# Patient Record
Sex: FEMALE
[De-identification: names, ages, dates, MRNs, and addresses within clinical notes are randomized per-mention and may not be internally consistent; named-entity substitution may affect disease eponyms.]

---

## 2022-04-22 PROBLEM — Z00.00 ENCOUNTER FOR PREVENTIVE HEALTH EXAMINATION: Status: ACTIVE | Noted: 2022-04-22

## 2022-04-26 ENCOUNTER — APPOINTMENT (OUTPATIENT)
Dept: PAIN MANAGEMENT | Facility: CLINIC | Age: 87
End: 2022-04-26
Payer: MEDICARE

## 2022-04-26 VITALS — WEIGHT: 90 LBS | BODY MASS INDEX: 18.14 KG/M2 | HEIGHT: 59 IN

## 2022-04-26 PROCEDURE — 99214 OFFICE O/P EST MOD 30 MIN: CPT | Mod: 25

## 2022-04-26 PROCEDURE — 62323 NJX INTERLAMINAR LMBR/SAC: CPT

## 2022-04-26 NOTE — PROCEDURE
[FreeTextEntry3] : Date of Service: 04/26/2022 \par \par Account: [id number]\par \par Patient: Jenni Shirley \par \par YOB: 1930\par \par Age: 89 year\par \par Surgeon: Chey Martin M.D.\par \par Pre-Operative Diagnosis:  Lumbosacral Radiculitis (M54.17)\par \par Post Operative Diagnosis: Lumbosacral Radiculitis (M54.17)\par \par Procedure: Interlaminar lumbar epidural steroid injection (L4-5) under fluoroscopic guidance\par \par Anesthesia:           MAC\par \par \par This procedure was carried out using fluoroscopic guidance.  The risks and benefits of the procedure were discussed extensively with the patient.  The consent of the patient was obtained and the following procedure was performed.\par \par The patient was placed in the prone position.  The lumbar area was prepped and draped in a sterile fashion.  Under AP view with slight cephalad-caudad angulation, the L4-5 interspace was identified and marked.  Using sterile technique the superficial skin was anesthetized with 1% Lidocaine without epinephrine.  A 20 gauge Tuohoy needle was advanced into the epidural space under fluoroscopy using ncxzh-rpjglkxuh-sjubh technique and using loss of resistance at the L4-5 level.  After negative aspiration for heme or CSF, an epidurogram was obtained using 3 cc Omnipaque contrast confirming epidural placement of the needle. \par \par Lumbar epidurogram showed no intrathecal or intravascular spread and showed adequate bilateral epidural spread from L1 to S1 levels.\par \par After this, 5 cc of preservative free normal saline and 80 mg of Kenalog were injected into the epidural space.\par \par The needle was subsequently removed.  Anesthesia personnel were present throughout the procedure.\par \par The patient tolerated the procedure well and was instructed to contact me immediately if there were any problems.\par \par Chey Martin M.D.\par \par

## 2022-04-26 NOTE — HISTORY OF PRESENT ILLNESS
[Lower back] : lower back [6] : 6 [2] : 2 [Burning] : burning [Intermittent] : intermittent [Household chores] : household chores [Leisure] : leisure [Injection therapy] : injection therapy [Walking] : walking [de-identified] : 04/26/2022: follow up today.  She thought she was getting an injection today.  Will schedule LESI L4-L5.\par \par 8/10/21- Patient had 70% relief from LESI. Complains of muscle spasms at night. Tizanidine renewed and told to drink water.\par \par 7/13/21: follow up today after bilateral L4/5 TFESI on 7/1/21. having issues with Colitis and swallowing and having endoscopy in 2 weeks. Pain when she walks. \par \par 6/25/21- Patient states 6 weeks ago she started saying pain was returning. Would like to schedule injection.\par \par 8/7/20: follow up today after b/l TFESI L4/5. Pt with relief of her back pain however still with burning in the bilateral legs. [] : no [FreeTextEntry7] : b/l legs

## 2022-04-26 NOTE — ASSESSMENT
[FreeTextEntry1] : Patient is presenting with acute/sub-acute radicular pain with impairment in ADLs and functionality.  The pain has not responded to  conservative care including nsaid therapy and/or physical therapy.  There is no bleeding tendency, unstable medical condition, or systemic infection.\par After discussing various treatment options with the patient including but not limited to oral medications, physical therapy, exercise, modalities as well as interventional spinal injections, we have decided with the following plan:\par I personally reviewed the MRI/CT scan images and agree with the radiologist's report. The radiological findings were discussed with the patient.\par The risks, benefits, contents and alternatives to injection were explained in full to the patient. Risks outlined include but are not limited to infection,sepsis, bleeding, post-dural puncture headache, nerve damage, temporary increase in pain, syncopal episode, failure to resolve symptoms, allergic reaction, symptom recurrence, and elevation of blood sugar in diabetics. Cortisone may cause immunosuppression. Patient understands the risks. All questions were answered. After discussion of options, patient requested an injection. Information regarding the injection was given to the patient. Which medications to stop prior to the injection was explained to the patient as well.\par Follow up in 1-2 weeks post injection for re-evaluation. \par Continue Home exercises, stretching, activity modification, physical therapy, and conservative care.\par Patient is presenting with acute/sub-acute radicular pain with impairment in ADLs and functionality. The pain has not responded to conservative care including nsaid therapy and/or physical therapy. There is no bleeding tendency, unstable medical condition, or systemic infection.\par \par

## 2022-05-24 ENCOUNTER — APPOINTMENT (OUTPATIENT)
Dept: PAIN MANAGEMENT | Facility: CLINIC | Age: 87
End: 2022-05-24
Payer: MEDICARE

## 2022-05-24 VITALS — WEIGHT: 90 LBS | HEIGHT: 59 IN | BODY MASS INDEX: 18.14 KG/M2

## 2022-05-24 PROCEDURE — 99214 OFFICE O/P EST MOD 30 MIN: CPT

## 2022-05-24 NOTE — HISTORY OF PRESENT ILLNESS
[Lower back] : lower back [6] : 6 [2] : 2 [Burning] : burning [Household chores] : household chores [Leisure] : leisure [Injection therapy] : injection therapy [Walking] : walking [Constant] : constant [Sitting] : sitting [Standing] : standing [Bending forward] : bending forward [Extending back] : extending back [Lying in bed] : lying in bed [Retired] : Work status: retired [] : no [FreeTextEntry7] : b/l legs [de-identified] : 05/24/2022: follow up today after LESI L4/5 on 4/26/22.  limited relief following. Pain in the lower back with radiation to the hips. worse with walking.  her last injection was a tfesi that gave her the most relief. \par \par 04/26/2022: follow up today.  She thought she was getting an injection today.  Will schedule LESI L4-L5.\par \par 8/10/21- Patient had 70% relief from LESI. Complains of muscle spasms at night. Tizanidine renewed and told to drink water.\par \par 7/13/21: follow up today after bilateral L4/5 TFESI on 7/1/21. having issues with Colitis and swallowing and having endoscopy in 2 weeks. Pain when she walks. \par \par 6/25/21- Patient states 6 weeks ago she started saying pain was returning. Would like to schedule injection.\par \par 8/7/20: follow up today after b/l TFESI L4/5. Pt with relief of her back pain however still with burning in the bilateral legs.\par \par \par Injections:\par LESI L4/5 (4/26/22)\par b/l TFESI L4/5 (7/1/22)\par \par MRI 2/14/19: L5-S1 there is a stable diffuse degenerative bulging disc with mild central and bilateral\par foraminal stenosis.\par At L4-5 there is decrease in the previously noted central disc herniation. However\par progressive degenerative productive posterior change left greater than right is noted and\par this does result in progressive moderate to severe central stenosis.\par At L3-4 there is progression of the proximal right foraminal disc herniation with\par progressive central stenosis and progressive right foraminal stenosis.\par At L2-3 there is a progressive diffuse degenerative bulging disc with degenerative\par posterior change resulting in progressive moderate to severe central stenosis.

## 2022-05-24 NOTE — ASSESSMENT
[FreeTextEntry1] : Patient is presenting with acute/sub-acute radicular pain with impairment in ADLs and functionality.  The pain has not responded to  conservative care including nsaid therapy and/or physical therapy.  There is no bleeding tendency, unstable medical condition, or systemic infection.\par After discussing various treatment options with the patient including but not limited to oral medications, physical therapy, exercise, modalities as well as interventional spinal injections, we have decided with the following plan:\par I personally reviewed the MRI/CT scan images and agree with the radiologist's report. The radiological findings were discussed with the patient.\par The risks, benefits, contents and alternatives to injection were explained in full to the patient. Risks outlined include but are not limited to infection,sepsis, bleeding, post-dural puncture headache, nerve damage, temporary increase in pain, syncopal episode, failure to resolve symptoms, allergic reaction, symptom recurrence, and elevation of blood sugar in diabetics. Cortisone may cause immunosuppression. Patient understands the risks. All questions were answered. After discussion of options, patient requested an injection. Information regarding the injection was given to the patient. Which medications to stop prior to the injection was explained to the patient as well.\par Follow up in 1-2 weeks post injection for re-evaluation. \par Continue Home exercises, stretching, activity modification, physical therapy, and conservative care.\par Patient is presenting with acute/sub-acute radicular pain with impairment in ADLs and functionality. The pain has not responded to conservative care including nsaid therapy and/or physical therapy. There is no bleeding tendency, unstable medical condition, or systemic infection.\par \par \par b/l TFESI L4/5\par

## 2022-06-07 ENCOUNTER — APPOINTMENT (OUTPATIENT)
Dept: PAIN MANAGEMENT | Facility: CLINIC | Age: 87
End: 2022-06-07

## 2022-06-07 ENCOUNTER — APPOINTMENT (OUTPATIENT)
Dept: PAIN MANAGEMENT | Facility: CLINIC | Age: 87
End: 2022-06-07
Payer: MEDICARE

## 2022-06-07 PROCEDURE — 64483 NJX AA&/STRD TFRM EPI L/S 1: CPT | Mod: 50

## 2022-06-07 NOTE — PROCEDURE
[FreeTextEntry3] : Date of Service: 06/07/2022 \par \par Account: [id number]\par \par Patient: Jenni Shirley \par \par YOB: 1930\par \par Age: 89 year\par   \par \par Surgeon:                               Chey Martin M.D.\par \par Assistant:                                 None.\par  \par Pre-Operative Diagnosis:     Lumbosacral Radiculitis (M54.17)                \par  \par Post Operative Diagnosis:    Lumbosacral Radiculitis (M54.17)                \par  \par Procedure:                              Right L4-5 transforaminal epidural steroid injection\par \par                                                   Left L4-5 transforaminal epidural steroid injection under fluoroscopic guidance.\par \par Anesthesia:                             MAC\par \par \par This procedure was carried out using fluoroscopic guidance.  The risks and benefits of the procedure were discussed extensively with the patient.  The consent of the patient was obtained and the following procedure was performed. The patient was placed in the prone position on the fluoroscopic table and the lumbar area was prepped and draped in a sterile fashion.\par \par The left L4-5 neural foramen was then identified on left oblique  "danica dog" anatomical view at the 6 o' clock position using fluoroscopic guidance, and the area was marked. The overlying skin and subcutaneous structures were anesthetized using sterile technique with 1% Lidocaine.  A 22 gauge spinal needle was directed toward the inferior (6 o'clock) position of the pedicle, which formed the roof of the identified foramen.  Once in the epidural space, after negative aspiration for heme and CSF, 1cc of Omnipaque contrast was injected to confirm epidural location and assess filling defects and rule out intravascular needle placement.\par \par The following contrast flow observed: no intravascular or intrathecal flow pattern was noted.  No blood or CSF was aspirated. Omnipaque spread medially in epidural space. \par \par After this, an injectate of 3 cc preservative free normal saline plus 40 mg of Kenalog was injected in the epidural space.\par \par The right L4-5 neural foramen was then identified on right oblique "danica dog" anatomical view at the 6 o' clock position using fluoroscopic guidance, and the area was marked. The overlying skin and subcutaneous structures were anesthetized using sterile technique with 1% Lidocaine.  A 22 gauge spinal needle was directed toward the inferior (6 o'clock) position of the pedicle, which formed the roof of the identified foramen.  Once in the epidural space, after negative aspiration for heme and CSF, 1cc of Omnipaque contrast was injected to confirm epidural location and assess filling defects and rule out intravascular needle placement.\par \par The following contrast flow observed: no intravascular or intrathecal flow pattern was noted.  No blood or CSF was aspirated. Omnipaque spread medially in epidural space. \par \par After this, an injectate of 3 cc preservative free normal saline plus 40 mg of Kenalog was injected in the epidural space.\par \par The needle was subsequently removed.  Vital signs remained normal.  Pulse oximeter was used throughout the procedure and the patient's pulse and oxygen saturation remained within normal limits.  The patient tolerated the procedure well.  There were no complications.  The patient was instructed to apply ice over the injection sites for twenty minutes every two hours for the next 24 to 48 hours.  The patient was also instructed to contact me immediately if there were any problems.\par \par Chey Martin M.D.\par

## 2022-06-28 ENCOUNTER — APPOINTMENT (OUTPATIENT)
Dept: PAIN MANAGEMENT | Facility: CLINIC | Age: 87
End: 2022-06-28

## 2023-06-08 ENCOUNTER — APPOINTMENT (OUTPATIENT)
Dept: PAIN MANAGEMENT | Facility: CLINIC | Age: 88
End: 2023-06-08
Payer: MEDICARE

## 2023-06-08 VITALS — WEIGHT: 90 LBS | HEIGHT: 59 IN | BODY MASS INDEX: 18.14 KG/M2

## 2023-06-08 PROCEDURE — J3490M: CUSTOM | Mod: NC

## 2023-06-08 PROCEDURE — 99214 OFFICE O/P EST MOD 30 MIN: CPT | Mod: 25

## 2023-06-08 PROCEDURE — 20552 NJX 1/MLT TRIGGER POINT 1/2: CPT

## 2023-06-08 NOTE — ASSESSMENT
[FreeTextEntry1] : Patient is presenting with acute/sub-acute radicular pain with impairment in ADLs and functionality.  The pain has not responded to  conservative care including nsaid therapy and/or physical therapy.  There is no bleeding tendency, unstable medical condition, or systemic infection.\par After discussing various treatment options with the patient including but not limited to oral medications, physical therapy, exercise, modalities as well as interventional spinal injections, we have decided with the following plan:\par I personally reviewed the MRI/CT scan images and agree with the radiologist's report. The radiological findings were discussed with the patient.\par The risks, benefits, contents and alternatives to injection were explained in full to the patient. Risks outlined include but are not limited to infection,sepsis, bleeding, post-dural puncture headache, nerve damage, temporary increase in pain, syncopal episode, failure to resolve symptoms, allergic reaction, symptom recurrence, and elevation of blood sugar in diabetics. Cortisone may cause immunosuppression. Patient understands the risks. All questions were answered. After discussion of options, patient requested an injection. Information regarding the injection was given to the patient. Which medications to stop prior to the injection was explained to the patient as well.\par Follow up in 1-2 weeks post injection for re-evaluation. \par Continue Home exercises, stretching, activity modification, physical therapy, and conservative care.\par Patient is presenting with acute/sub-acute radicular pain with impairment in ADLs and functionality. The pain has not responded to conservative care including nsaid therapy and/or physical therapy. There is no bleeding tendency, unstable medical condition, or systemic infection.\par \par \par b/l TFESI L4/5\par \par get new MRI\par

## 2023-06-08 NOTE — HISTORY OF PRESENT ILLNESS
[Lower back] : lower back [6] : 6 [2] : 2 [Burning] : burning [Constant] : constant [Household chores] : household chores [Leisure] : leisure [Injection therapy] : injection therapy [Sitting] : sitting [Standing] : standing [Walking] : walking [Bending forward] : bending forward [Extending back] : extending back [Lying in bed] : lying in bed [Retired] : Work status: retired [] : no [FreeTextEntry7] : b/l legs, hips [de-identified] : 6/7/23- fu today.  Pain is returning in low back and radiating into hips.  Had relief from last epidural until 2 months ago.  Will get new MRI and give TPI today\par \par 05/24/2022: follow up today after LESI L4/5 on 4/26/22.  limited relief following. Pain in the lower back with radiation to the hips. worse with walking.  her last injection was a tfesi that gave her the most relief. \par \par 04/26/2022: follow up today.  She thought she was getting an injection today.  Will schedule LESI L4-L5.\par \par 8/10/21- Patient had 70% relief from LESI. Complains of muscle spasms at night. Tizanidine renewed and told to drink water.\par \par 7/13/21: follow up today after bilateral L4/5 TFESI on 7/1/21. having issues with Colitis and swallowing and having endoscopy in 2 weeks. Pain when she walks. \par \par 6/25/21- Patient states 6 weeks ago she started saying pain was returning. Would like to schedule injection.\par \par 8/7/20: follow up today after b/l TFESI L4/5. Pt with relief of her back pain however still with burning in the bilateral legs.\par \par \par Injections:\par LESI L4/5 (4/26/22)\par b/l TFESI L4/5 (7/1/22)\par \par MRI 2/14/19: L5-S1 there is a stable diffuse degenerative bulging disc with mild central and bilateral\par foraminal stenosis.\par At L4-5 there is decrease in the previously noted central disc herniation. However\par progressive degenerative productive posterior change left greater than right is noted and\par this does result in progressive moderate to severe central stenosis.\par At L3-4 there is progression of the proximal right foraminal disc herniation with\par progressive central stenosis and progressive right foraminal stenosis.\par At L2-3 there is a progressive diffuse degenerative bulging disc with degenerative\par posterior change resulting in progressive moderate to severe central stenosis.

## 2023-06-09 ENCOUNTER — FORM ENCOUNTER (OUTPATIENT)
Age: 88
End: 2023-06-09

## 2023-06-10 ENCOUNTER — APPOINTMENT (OUTPATIENT)
Dept: MRI IMAGING | Facility: CLINIC | Age: 88
End: 2023-06-10
Payer: MEDICARE

## 2023-06-10 PROCEDURE — 72148 MRI LUMBAR SPINE W/O DYE: CPT | Mod: MH

## 2023-06-22 ENCOUNTER — APPOINTMENT (OUTPATIENT)
Dept: PAIN MANAGEMENT | Facility: CLINIC | Age: 88
End: 2023-06-22
Payer: MEDICARE

## 2023-06-22 PROCEDURE — 64483 NJX AA&/STRD TFRM EPI L/S 1: CPT | Mod: RT

## 2023-06-22 NOTE — PROCEDURE
[FreeTextEntry3] : Date of Service: 06/22/2023 \par \par Account: [id number]\par \par Patient: EZEKIEL KOO \par \par YOB: 1930\par \par Age: 89 year\par   \par \par Surgeon:                               Chey Martin M.D.\par \par Assistant:                                 None.\par  \par Pre-Operative Diagnosis:     Lumbosacral Radiculitis (M54.17)                \par  \par Post Operative Diagnosis:    Lumbosacral Radiculitis (M54.17)                \par  \par Procedure:                              Right L4-5 transforaminal epidural steroid injection\par \par                                                   Left L4-5 transforaminal epidural steroid injection under fluoroscopic guidance.\par \par Anesthesia:                             MAC\par \par \par This procedure was carried out using fluoroscopic guidance.  The risks and benefits of the procedure were discussed extensively with the patient.  The consent of the patient was obtained and the following procedure was performed. The patient was placed in the prone position on the fluoroscopic table and the lumbar area was prepped and draped in a sterile fashion.\par \par The left L4-5 neural foramen was then identified on left oblique  "danica dog" anatomical view at the 6 o' clock position using fluoroscopic guidance, and the area was marked. The overlying skin and subcutaneous structures were anesthetized using sterile technique with 1% Lidocaine.  A 22 gauge spinal needle was directed toward the inferior (6 o'clock) position of the pedicle, which formed the roof of the identified foramen.  Once in the epidural space, after negative aspiration for heme and CSF, 1cc of Omnipaque contrast was injected to confirm epidural location and assess filling defects and rule out intravascular needle placement.\par \par The following contrast flow observed: no intravascular or intrathecal flow pattern was noted.  No blood or CSF was aspirated. Omnipaque spread medially in epidural space. \par \par After this, an injectate of 3 cc preservative free normal saline plus 40 mg of Kenalog was injected in the epidural space.\par \par The right L4-5 neural foramen was then identified on right oblique "danica dog" anatomical view at the 6 o' clock position using fluoroscopic guidance, and the area was marked. The overlying skin and subcutaneous structures were anesthetized using sterile technique with 1% Lidocaine.  A 22 gauge spinal needle was directed toward the inferior (6 o'clock) position of the pedicle, which formed the roof of the identified foramen.  Once in the epidural space, after negative aspiration for heme and CSF, 1cc of Omnipaque contrast was injected to confirm epidural location and assess filling defects and rule out intravascular needle placement.\par \par The following contrast flow observed: no intravascular or intrathecal flow pattern was noted.  No blood or CSF was aspirated. Omnipaque spread medially in epidural space. \par \par After this, an injectate of 3 cc preservative free normal saline plus 40 mg of Kenalog was injected in the epidural space.\par \par The needle was subsequently removed.  Vital signs remained normal.  Pulse oximeter was used throughout the procedure and the patient's pulse and oxygen saturation remained within normal limits.  The patient tolerated the procedure well.  There were no complications.  The patient was instructed to apply ice over the injection sites for twenty minutes every two hours for the next 24 to 48 hours.  The patient was also instructed to contact me immediately if there were any problems.\par \par Chey Martin M.D.

## 2023-07-13 ENCOUNTER — APPOINTMENT (OUTPATIENT)
Dept: PAIN MANAGEMENT | Facility: CLINIC | Age: 88
End: 2023-07-13

## 2023-07-20 ENCOUNTER — APPOINTMENT (OUTPATIENT)
Dept: PAIN MANAGEMENT | Facility: CLINIC | Age: 88
End: 2023-07-20
Payer: MEDICARE

## 2023-07-20 VITALS — HEIGHT: 59 IN | BODY MASS INDEX: 18.35 KG/M2 | WEIGHT: 91 LBS

## 2023-07-20 DIAGNOSIS — M54.16 RADICULOPATHY, LUMBAR REGION: ICD-10-CM

## 2023-07-20 PROCEDURE — 99214 OFFICE O/P EST MOD 30 MIN: CPT

## 2023-07-20 RX ORDER — BUPRENORPHINE 10 UG/H
10 PATCH, EXTENDED RELEASE TRANSDERMAL
Qty: 4 | Refills: 0 | Status: ACTIVE | COMMUNITY
Start: 2023-07-20 | End: 1900-01-01

## 2023-07-20 NOTE — ASSESSMENT
[FreeTextEntry1] : After discussing various treatment options with the patient including but not limited to oral medications, physical therapy, exercise, modalities as well as interventional spinal injections, we have decided with the following plan:\par \par 1) The patient is stable on current medication with analgesia and without notable side effects or any obvious aberrant behaviors exhibited.   There is clinically meaningful improvement in pain and function that outweighs risks to patient safety.  I have discussed risks and realistic benefits of therapy and patient and clinician responsibilities for managing therapy.   Will renew medication today.\par \par butrans patch\par

## 2023-07-20 NOTE — HISTORY OF PRESENT ILLNESS
[Lower back] : lower back [6] : 6 [2] : 2 [Burning] : burning [Constant] : constant [Household chores] : household chores [Leisure] : leisure [Injection therapy] : injection therapy [Sitting] : sitting [Standing] : standing [Walking] : walking [Bending forward] : bending forward [Extending back] : extending back [Lying in bed] : lying in bed [Retired] : Work status: retired [] : no [FreeTextEntry7] : b/l legs, hips [de-identified] : 07/20/2023: follow up today for B/L L4-5 TFESI on 6/22.  No relief.  Pain is worse in left back and leg.  Has swelling B/L LE L>R.   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  Pain is returning in low back and radiating into hips.  Had relief from last epidural until 2 months ago.  Will get new MRI and give TPI today\par \par 05/24/2022: follow up today after LESI L4/5 on 4/26/22.  limited relief following. Pain in the lower back with radiation to the hips. worse with walking.  her last injection was a tfesi that gave her the most relief. \par \par 04/26/2022: follow up today.  She thought she was getting an injection today.  Will schedule LESI L4-L5.\par \par 8/10/21- Patient had 70% relief from LESI. Complains of muscle spasms at night. Tizanidine renewed and told to drink water.\par \par 7/13/21: follow up today after bilateral L4/5 TFESI on 7/1/21. having issues with Colitis and swallowing and having endoscopy in 2 weeks. Pain when she walks. \par \par 6/25/21- Patient states 6 weeks ago she started saying pain was returning. Would like to schedule injection.\par \par 8/7/20: follow up today after b/l TFESI L4/5. Pt with relief of her back pain however still with burning in the bilateral legs.\par \par \par Injections:\par LESI L4/5 (4/26/22)\par b/l TFESI L4/5 (7/1/22, 6/22/23)\par \par MRI 2/14/19: L5-S1 there is a stable diffuse degenerative bulging disc with mild central and bilateral\par foraminal stenosis.\par At L4-5 there is decrease in the previously noted central disc herniation. However\par progressive degenerative productive posterior change left greater than right is noted and\par this does result in progressive moderate to severe central stenosis.\par At L3-4 there is progression of the proximal right foraminal disc herniation with\par progressive central stenosis and progressive right foraminal stenosis.\par At L2-3 there is a progressive diffuse degenerative bulging disc with degenerative\par posterior change resulting in progressive moderate to severe central stenosis.

## 2023-08-31 ENCOUNTER — APPOINTMENT (OUTPATIENT)
Dept: PAIN MANAGEMENT | Facility: CLINIC | Age: 88
End: 2023-08-31
Payer: MEDICARE

## 2023-08-31 VITALS — HEIGHT: 59 IN | BODY MASS INDEX: 18.35 KG/M2 | WEIGHT: 91 LBS

## 2023-08-31 DIAGNOSIS — G89.29 LOW BACK PAIN, UNSPECIFIED: ICD-10-CM

## 2023-08-31 DIAGNOSIS — M54.50 LOW BACK PAIN, UNSPECIFIED: ICD-10-CM

## 2023-08-31 PROCEDURE — 99214 OFFICE O/P EST MOD 30 MIN: CPT

## 2023-08-31 RX ORDER — TRAMADOL HYDROCHLORIDE 50 MG/1
50 TABLET, COATED ORAL
Qty: 14 | Refills: 0 | Status: ACTIVE | COMMUNITY
Start: 2023-08-31 | End: 1900-01-01

## 2023-08-31 NOTE — HISTORY OF PRESENT ILLNESS
[Lower back] : lower back [6] : 6 [2] : 2 [Burning] : burning [Constant] : constant [Household chores] : household chores [Leisure] : leisure [Injection therapy] : injection therapy [Sitting] : sitting [Standing] : standing [Walking] : walking [Bending forward] : bending forward [Extending back] : extending back [Lying in bed] : lying in bed [Retired] : Work status: retired [FreeTextEntry1] : 08/31/2023: follow up today for severe pain. She had severe pain in the lower back for the last couple of days.  pain radiates up.  Pain radiates to the right leg.  Has swelling in the b/l legs.   07/20/2023: follow up today for B/L L4-5 TFESI on 6/22.  No relief.  Pain is worse in left back and leg.  Has swelling B/L LE L>R.    MRI: 6/10/23: Multilevel lower thoracic and lumbar degenerative disc disease and facet osteoarthrosis with grade I  degenerative spondylolisthesis L3-4 with a minimal levoscoliosis. Mild central stenosis at L2-3 with mild compression of the right L3 nerve root in the lateral recess. Grade I degenerative spondylolisthesis at L3-4 with significant central stenosis. Mild central stenosis at L4-5. Disc bulge at L5-S1 without stenosis or nerve root compression. Modic type I endplate changes adjacent to the L5-S1 disc.  6/7/23- fu today.  Pain is returning in low back and radiating into hips.  Had relief from last epidural until 2 months ago.  Will get new MRI and give TPI today  05/24/2022: follow up today after LESI L4/5 on 4/26/22.  limited relief following. Pain in the lower back with radiation to the hips. worse with walking.  her last injection was a tfesi that gave her the most relief.   04/26/2022: follow up today.  She thought she was getting an injection today.  Will schedule LESI L4-L5.  8/10/21- Patient had 70% relief from LESI. Complains of muscle spasms at night. Tizanidine renewed and told to drink water.  7/13/21: follow up today after bilateral L4/5 TFESI on 7/1/21. having issues with Colitis and swallowing and having endoscopy in 2 weeks. Pain when she walks.   6/25/21- Patient states 6 weeks ago she started saying pain was returning. Would like to schedule injection.  8/7/20: follow up today after b/l TFESI L4/5. Pt with relief of her back pain however still with burning in the bilateral legs.   Injections: LESI L4/5 (4/26/22) b/l TFESI L4/5 (7/1/22, 6/22/23)  MRI 2/14/19: L5-S1 there is a stable diffuse degenerative bulging disc with mild central and bilateral foraminal stenosis. At L4-5 there is decrease in the previously noted central disc herniation. However progressive degenerative productive posterior change left greater than right is noted and this does result in progressive moderate to severe central stenosis. At L3-4 there is progression of the proximal right foraminal disc herniation with progressive central stenosis and progressive right foraminal stenosis. At L2-3 there is a progressive diffuse degenerative bulging disc with degenerative posterior change resulting in progressive moderate to severe central stenosis. [] : no [FreeTextEntry7] : b/l legs, hips [de-identified] : 08/31/2023: follow up today for   07/20/2023: follow up today for B/L L4-5 TFESI on 6/22.  No relief.  Pain is worse in left back and leg.  Has swelling B/L LE L>R.    MRI: 6/10/23: Multilevel lower thoracic and lumbar degenerative disc disease and facet osteoarthrosis with grade I  degenerative spondylolisthesis L3-4 with a minimal levoscoliosis. Mild central stenosis at L2-3 with mild compression of the right L3 nerve root in the lateral recess. Grade I degenerative spondylolisthesis at L3-4 with significant central stenosis. Mild central stenosis at L4-5. Disc bulge at L5-S1 without stenosis or nerve root compression. Modic type I endplate changes adjacent to the L5-S1 disc.  6/7/23- fu today.  Pain is returning in low back and radiating into hips.  Had relief from last epidural until 2 months ago.  Will get new MRI and give TPI today  05/24/2022: follow up today after LESI L4/5 on 4/26/22.  limited relief following. Pain in the lower back with radiation to the hips. worse with walking.  her last injection was a tfesi that gave her the most relief.   04/26/2022: follow up today.  She thought she was getting an injection today.  Will schedule LESI L4-L5.  8/10/21- Patient had 70% relief from LESI. Complains of muscle spasms at night. Tizanidine renewed and told to drink water.  7/13/21: follow up today after bilateral L4/5 TFESI on 7/1/21. having issues with Colitis and swallowing and having endoscopy in 2 weeks. Pain when she walks.   6/25/21- Patient states 6 weeks ago she started saying pain was returning. Would like to schedule injection.  8/7/20: follow up today after b/l TFESI L4/5. Pt with relief of her back pain however still with burning in the bilateral legs.   Injections: LESI L4/5 (4/26/22) b/l TFESI L4/5 (7/1/22, 6/22/23)  MRI 2/14/19: L5-S1 there is a stable diffuse degenerative bulging disc with mild central and bilateral foraminal stenosis. At L4-5 there is decrease in the previously noted central disc herniation. However progressive degenerative productive posterior change left greater than right is noted and this does result in progressive moderate to severe central stenosis. At L3-4 there is progression of the proximal right foraminal disc herniation with progressive central stenosis and progressive right foraminal stenosis. At L2-3 there is a progressive diffuse degenerative bulging disc with degenerative posterior change resulting in progressive moderate to severe central stenosis.

## 2023-08-31 NOTE — ASSESSMENT
[FreeTextEntry1] : After discussing various treatment options with the patient including but not limited to oral medications, physical therapy, exercise, modalities as well as interventional spinal injections, we have decided with the following plan:  1) The patient is stable on current medication with analgesia and without notable side effects or any obvious aberrant behaviors exhibited.   There is clinically meaningful improvement in pain and function that outweighs risks to patient safety.  I have discussed risks and realistic benefits of therapy and patient and clinician responsibilities for managing therapy.   Will renew medication today.  tramadol   2)  MRI pelvis to further evaluate.  No (0)

## 2023-09-21 ENCOUNTER — APPOINTMENT (OUTPATIENT)
Dept: PAIN MANAGEMENT | Facility: CLINIC | Age: 88
End: 2023-09-21
Payer: MEDICARE

## 2023-09-21 PROCEDURE — 64483 NJX AA&/STRD TFRM EPI L/S 1: CPT | Mod: RT

## 2023-10-05 ENCOUNTER — APPOINTMENT (OUTPATIENT)
Dept: PAIN MANAGEMENT | Facility: CLINIC | Age: 88
End: 2023-10-05
Payer: MEDICARE

## 2023-10-05 VITALS — BODY MASS INDEX: 17.34 KG/M2 | WEIGHT: 86 LBS | HEIGHT: 59 IN

## 2023-10-05 DIAGNOSIS — M54.12 RADICULOPATHY, CERVICAL REGION: ICD-10-CM

## 2023-10-05 PROCEDURE — 99214 OFFICE O/P EST MOD 30 MIN: CPT

## 2023-10-19 ENCOUNTER — APPOINTMENT (OUTPATIENT)
Dept: PAIN MANAGEMENT | Facility: CLINIC | Age: 88
End: 2023-10-19
Payer: MEDICARE

## 2023-10-19 PROCEDURE — 64483 NJX AA&/STRD TFRM EPI L/S 1: CPT | Mod: LT

## 2023-11-02 ENCOUNTER — APPOINTMENT (OUTPATIENT)
Dept: PAIN MANAGEMENT | Facility: CLINIC | Age: 88
End: 2023-11-02

## 2024-11-07 ENCOUNTER — APPOINTMENT (OUTPATIENT)
Dept: PAIN MANAGEMENT | Facility: CLINIC | Age: 89
End: 2024-11-07
Payer: MEDICARE

## 2024-11-07 VITALS — BODY MASS INDEX: 18.14 KG/M2 | HEIGHT: 59 IN | WEIGHT: 90 LBS

## 2024-11-07 DIAGNOSIS — G89.29 LOW BACK PAIN, UNSPECIFIED: ICD-10-CM

## 2024-11-07 DIAGNOSIS — M54.50 LOW BACK PAIN, UNSPECIFIED: ICD-10-CM

## 2024-11-07 PROCEDURE — 99214 OFFICE O/P EST MOD 30 MIN: CPT

## 2024-11-14 ENCOUNTER — APPOINTMENT (OUTPATIENT)
Dept: GASTROENTEROLOGY | Facility: CLINIC | Age: 89
End: 2024-11-14

## 2024-12-05 ENCOUNTER — APPOINTMENT (OUTPATIENT)
Dept: PAIN MANAGEMENT | Facility: CLINIC | Age: 88
End: 2024-12-05
Payer: MEDICARE

## 2024-12-05 DIAGNOSIS — M54.16 RADICULOPATHY, LUMBAR REGION: ICD-10-CM

## 2024-12-05 PROCEDURE — 64483 NJX AA&/STRD TFRM EPI L/S 1: CPT | Mod: 50

## 2024-12-19 ENCOUNTER — APPOINTMENT (OUTPATIENT)
Dept: PAIN MANAGEMENT | Facility: CLINIC | Age: 88
End: 2024-12-19